# Patient Record
Sex: FEMALE | Race: ASIAN | URBAN - METROPOLITAN AREA
[De-identification: names, ages, dates, MRNs, and addresses within clinical notes are randomized per-mention and may not be internally consistent; named-entity substitution may affect disease eponyms.]

---

## 2023-06-07 ENCOUNTER — APPOINTMENT (RX ONLY)
Dept: URBAN - METROPOLITAN AREA CLINIC 123 | Facility: CLINIC | Age: 33
Setting detail: DERMATOLOGY
End: 2023-06-07

## 2023-06-07 DIAGNOSIS — L72.0 EPIDERMAL CYST: ICD-10-CM | Status: INADEQUATELY CONTROLLED

## 2023-06-07 PROBLEM — L30.9 DERMATITIS, UNSPECIFIED: Status: ACTIVE | Noted: 2023-06-07

## 2023-06-07 PROCEDURE — ? TREATMENT REGIMEN

## 2023-06-07 PROCEDURE — 99203 OFFICE O/P NEW LOW 30 MIN: CPT

## 2023-06-07 PROCEDURE — ? PRESCRIPTION

## 2023-06-07 PROCEDURE — ? COUNSELING

## 2023-06-07 PROCEDURE — ? ADDITIONAL NOTES

## 2023-06-07 RX ORDER — DOXYCYCLINE HYCLATE 100 MG/1
CAPSULE, GELATIN COATED ORAL
Qty: 14 | Refills: 0 | Status: ERX | COMMUNITY
Start: 2023-06-07

## 2023-06-07 RX ADMIN — DOXYCYCLINE HYCLATE: 100 CAPSULE, GELATIN COATED ORAL at 00:00

## 2023-06-07 ASSESSMENT — LOCATION SIMPLE DESCRIPTION DERM: LOCATION SIMPLE: TRAPEZIAL NECK

## 2023-06-07 ASSESSMENT — LOCATION DETAILED DESCRIPTION DERM: LOCATION DETAILED: MID TRAPEZIAL NECK

## 2023-06-07 ASSESSMENT — LOCATION ZONE DERM: LOCATION ZONE: NECK

## 2023-06-07 NOTE — HPI: CYST
Is This A New Presentation, Or A Follow-Up?: Cyst
Additional History: She saw her PCP who referred her to the ED and she underwent I&D with packing.  This was removed today.  She has been on cephalexin and then doxycycline 100 mg bid for 7 days.

## 2023-06-07 NOTE — PROCEDURE: TREATMENT REGIMEN
Plan: RV in 2 weeks
Detail Level: Zone
Continue Regimen: Doxycycline 100 mg bid for another week (14 days total)

## 2023-06-20 ENCOUNTER — APPOINTMENT (RX ONLY)
Dept: URBAN - METROPOLITAN AREA CLINIC 123 | Facility: CLINIC | Age: 33
Setting detail: DERMATOLOGY
End: 2023-06-20

## 2023-06-20 DIAGNOSIS — L0391 CELLULITIS AND ABSCESS OF UNSPECIFIED SITES: ICD-10-CM | Status: IMPROVED

## 2023-06-20 DIAGNOSIS — L0390 CELLULITIS AND ABSCESS OF UNSPECIFIED SITES: ICD-10-CM | Status: IMPROVED

## 2023-06-20 PROBLEM — L02.11 CUTANEOUS ABSCESS OF NECK: Status: ACTIVE | Noted: 2023-06-20

## 2023-06-20 PROCEDURE — 99212 OFFICE O/P EST SF 10 MIN: CPT

## 2023-06-20 PROCEDURE — ? ADDITIONAL NOTES

## 2023-06-20 PROCEDURE — ? COUNSELING

## 2023-06-20 PROCEDURE — ? FULL BODY SKIN EXAM - DECLINED

## 2023-06-20 ASSESSMENT — LOCATION DETAILED DESCRIPTION DERM: LOCATION DETAILED: MID TRAPEZIAL NECK

## 2023-06-20 ASSESSMENT — LOCATION ZONE DERM: LOCATION ZONE: NECK

## 2023-06-20 ASSESSMENT — LOCATION SIMPLE DESCRIPTION DERM: LOCATION SIMPLE: TRAPEZIAL NECK

## 2023-06-20 NOTE — PROCEDURE: ADDITIONAL NOTES
Render Risk Assessment In Note?: no
Additional Notes: Findings significantly improved.  No evidence of active infection and inflammation almost completely resolved.  Discussed removal of possible underlying cyst if present once healed - will reassess in one month.  Does have other epi cyst nearby.  Discussed wound cares should continue and Vaseline/bandage applied daily until completely healed.  Will recheck in one month.  She will reach out sooner for recurrence of infection/inflammation in the meantime.
Detail Level: Detailed

## 2023-09-20 NOTE — PROCEDURE: ADDITIONAL NOTES
Additional Notes: The patient is status post I&D externally.  The area is c/w an inflamed / infected epidermoid cyst vs abscess.  It is improving.  I advised another week of oral doxycycline 100 mg bid.  If underlying cyst is present, then ultimately, excision made be needed for definitive treatment.  We will reassess at follow-up in two weeks.
Detail Level: Detailed
Render Risk Assessment In Note?: no
Agueda Cardona(Attending)